# Patient Record
Sex: FEMALE | Race: WHITE | NOT HISPANIC OR LATINO
[De-identification: names, ages, dates, MRNs, and addresses within clinical notes are randomized per-mention and may not be internally consistent; named-entity substitution may affect disease eponyms.]

---

## 2020-06-24 ENCOUNTER — TRANSCRIPTION ENCOUNTER (OUTPATIENT)
Age: 35
End: 2020-06-24

## 2020-06-29 ENCOUNTER — TRANSCRIPTION ENCOUNTER (OUTPATIENT)
Age: 35
End: 2020-06-29

## 2020-07-14 ENCOUNTER — TRANSCRIPTION ENCOUNTER (OUTPATIENT)
Age: 35
End: 2020-07-14

## 2021-10-14 ENCOUNTER — TRANSCRIPTION ENCOUNTER (OUTPATIENT)
Age: 36
End: 2021-10-14

## 2024-01-26 ENCOUNTER — APPOINTMENT (OUTPATIENT)
Dept: OTOLARYNGOLOGY | Facility: CLINIC | Age: 39
End: 2024-01-26
Payer: COMMERCIAL

## 2024-01-26 ENCOUNTER — NON-APPOINTMENT (OUTPATIENT)
Age: 39
End: 2024-01-26

## 2024-01-26 VITALS
WEIGHT: 130 LBS | HEIGHT: 66 IN | BODY MASS INDEX: 20.89 KG/M2 | SYSTOLIC BLOOD PRESSURE: 120 MMHG | DIASTOLIC BLOOD PRESSURE: 78 MMHG | HEART RATE: 99 BPM

## 2024-01-26 PROBLEM — Z00.00 ENCOUNTER FOR PREVENTIVE HEALTH EXAMINATION: Status: ACTIVE | Noted: 2024-01-26

## 2024-01-26 PROCEDURE — 99204 OFFICE O/P NEW MOD 45 MIN: CPT | Mod: 25

## 2024-01-26 PROCEDURE — 31579 LARYNGOSCOPY TELESCOPIC: CPT

## 2024-01-26 NOTE — PROCEDURE
[de-identified] : -  Pre-operative Diagnosis: Dysphonia Post-operative Diagnosis: Same Anesthesia: Topical - 1 % Lidocaine/Phenylephrine Procedure: Flexible Laryngoscopy with Stroboscopy - CPT 67621   Procedure Details: The patient was placed in the sitting position. After decongestant and anesthesia were applied the laryngoscope was passed. The nasal cavities, nasopharynx, oropharynx, hypopharynx, and larynx were all examined. Vocal folds were examined during respiration and phonation. The following findings were noted:   Findings: Nose: Septum is midline, turbinates are normal, nasal airways patent, mucosa normal Nasopharynx: Adenoids normal, no masses, eustachian tube normal Oropharynx: Pharyngeal walls symmetric and without lesion. Tonsils/fossae symmetric Hypopharynx: Hypopharynx and pyriform sinuses without lesion. No masses or asymmetry. + pooling of secretions. Larynx: Epiglottis and aryepiglottic folds were sharp and crisp bilaterally. Bilateral false vocal folds normal appearance. Airway was widely patent.   Strobe Exam Ratings   TVF Appearance: hakeem TVF Mobility: normal Edema/hypertrophy: normal Mucus on TVF: normal Glottic Closure: normal/adequate Mucosal Wave: normal Amplitude of Vibration: normal Phase: symmetric Supraglottic Hyperfunction: none Other Findings: none   Condition: Stable. Patient tolerated procedure well.   Complications: None

## 2024-01-26 NOTE — HISTORY OF PRESENT ILLNESS
[de-identified] : 1/26/24: 39 y/o F presents with inability to burp for several years. She cannot recall the last time she was able to burp. She endorses gurgling sensation in her throat but cannot burp. She intermittently gets gas pains throughout the day and abdominal discomfort. She is seeing a GI for small intestinal bacterial overgrowth. No issues eating, chewing, or swallowing. No cough, or regurgitation of food. No issues breathing. She has history of vocal nodules in her 20's. She feels her voice has been "raspy" for several years. No significant voice demands. She recently had second baby in August and is a full-time mom of 2 children. She has history of vocal cord paralysis after croup as a child.   Diet: +coffee, tomato, citrus, garlic, onion, blueberry, spicy food -tea, soda, chocolate, mint, citrus, carbonated beverages water intake: 8 glasses late night eating: sometimes

## 2024-01-26 NOTE — ASSESSMENT
[FreeTextEntry1] : - 39 y/o F presents with inability to burp for several years. She cannot recall the last time she was able to burp. She endorses gurgling sensation in her throat but cannot burp. She feels her voice has been "raspy" for several years. Physical exam/ laryngoscopy/ stroboscopy was normal. 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 We discussed treatment options including suspension MicroDirect laryngoscopy and injection of Botox into cricopharyngeus muscle, pending medical clearance. Risk, benefits and alternatives of surgery were discussed including bleeding, infection, pain, risk of anesthesia, and chipped teeth.  -Plan for MicroDirect laryngoscopy and injection of Botox into cricopharyngeus muscle -Medical clearance

## 2024-01-26 NOTE — PHYSICAL EXAM
[Normal] : mucosa is normal [Midline] : trachea located in midline position [FreeTextEntry1] : good projection, good range

## 2024-02-08 ENCOUNTER — APPOINTMENT (OUTPATIENT)
Dept: INTERNAL MEDICINE | Facility: CLINIC | Age: 39
End: 2024-02-08
Payer: COMMERCIAL

## 2024-02-08 VITALS
HEIGHT: 67 IN | WEIGHT: 130 LBS | DIASTOLIC BLOOD PRESSURE: 80 MMHG | BODY MASS INDEX: 20.4 KG/M2 | TEMPERATURE: 97.7 F | SYSTOLIC BLOOD PRESSURE: 118 MMHG | HEART RATE: 75 BPM | OXYGEN SATURATION: 98 %

## 2024-02-08 DIAGNOSIS — Z01.818 ENCOUNTER FOR OTHER PREPROCEDURAL EXAMINATION: ICD-10-CM

## 2024-02-08 PROCEDURE — 93000 ELECTROCARDIOGRAM COMPLETE: CPT

## 2024-02-08 PROCEDURE — 36415 COLL VENOUS BLD VENIPUNCTURE: CPT

## 2024-02-08 PROCEDURE — 99214 OFFICE O/P EST MOD 30 MIN: CPT | Mod: 25

## 2024-02-09 PROBLEM — Z01.818 PREOP EXAMINATION: Status: ACTIVE | Noted: 2024-02-09

## 2024-02-09 LAB
ANION GAP SERPL CALC-SCNC: 14 MMOL/L
BUN SERPL-MCNC: 12 MG/DL
CALCIUM SERPL-MCNC: 9.2 MG/DL
CHLORIDE SERPL-SCNC: 105 MMOL/L
CO2 SERPL-SCNC: 19 MMOL/L
CREAT SERPL-MCNC: 0.63 MG/DL
EGFR: 118 ML/MIN/1.73M2
GLUCOSE SERPL-MCNC: 94 MG/DL
HCT VFR BLD CALC: 36.7 %
HGB BLD-MCNC: 12.5 G/DL
MCHC RBC-ENTMCNC: 32.9 PG
MCHC RBC-ENTMCNC: 34.1 GM/DL
MCV RBC AUTO: 96.6 FL
PLATELET # BLD AUTO: 342 K/UL
POTASSIUM SERPL-SCNC: 4.3 MMOL/L
RBC # BLD: 3.8 M/UL
RBC # FLD: 13.8 %
SODIUM SERPL-SCNC: 138 MMOL/L
WBC # FLD AUTO: 8.04 K/UL

## 2024-02-09 NOTE — PLAN
[FreeTextEntry1] : #Preoperative Risk Assessment EKG performed, NSR. CBC and CMP checked per surgeon request Patient medically optimized and cleared for procedure.

## 2024-02-09 NOTE — HISTORY OF PRESENT ILLNESS
[No Pertinent Cardiac History] : no history of aortic stenosis, atrial fibrillation, coronary artery disease, recent myocardial infarction, or implantable device/pacemaker [No Pertinent Pulmonary History] : no history of asthma, COPD, sleep apnea, or smoking [No Adverse Anesthesia Reaction] : no adverse anesthesia reaction in self or family member [(Patient denies any chest pain, claudication, dyspnea on exertion, orthopnea, palpitations or syncope)] : Patient denies any chest pain, claudication, dyspnea on exertion, orthopnea, palpitations or syncope [Excellent (>10 METs)] : Excellent (>10 METs) [Herbal Supplements: _____] : Herbal Supplements: [unfilled] [Chronic Anticoagulation] : no chronic anticoagulation [Chronic Kidney Disease] : no chronic kidney disease [FreeTextEntry1] : cricopharyngeal injection of botox [FreeTextEntry2] : February 21 2024 [FreeTextEntry3] : Dr. Griffiths [FreeTextEntry4] : 38F no significant PMHx, presents for pre-op clearance before cricopharyngeal injection of botox with ENT Dr. Griffiths. Patient reports some mild abdominal distention and mild R hip pain which she says is 2/2 orthopedic injury when she was younger. Otherwise no acute complaints. Is able to perform all household duties and walk miles outside without any dyspnea or cardiopulmonary discomfort. Patient is medically optimized for procedure, and labs and EKG not required from medical standpoint, but performed per surgeon's request.

## 2024-02-27 ENCOUNTER — TRANSCRIPTION ENCOUNTER (OUTPATIENT)
Age: 39
End: 2024-02-27

## 2024-02-27 RX ORDER — SODIUM CHLORIDE 9 MG/ML
1000 INJECTION, SOLUTION INTRAVENOUS
Refills: 0 | Status: DISCONTINUED | OUTPATIENT
Start: 2024-02-28 | End: 2024-02-28

## 2024-02-27 RX ORDER — ACETAMINOPHEN 500 MG
1000 TABLET ORAL ONCE
Refills: 0 | Status: DISCONTINUED | OUTPATIENT
Start: 2024-02-28 | End: 2024-02-28

## 2024-02-27 RX ORDER — OXYCODONE HYDROCHLORIDE 5 MG/1
5 TABLET ORAL ONCE
Refills: 0 | Status: DISCONTINUED | OUTPATIENT
Start: 2024-02-28 | End: 2024-02-28

## 2024-02-27 RX ORDER — FENTANYL CITRATE 50 UG/ML
25 INJECTION INTRAVENOUS
Refills: 0 | Status: DISCONTINUED | OUTPATIENT
Start: 2024-02-28 | End: 2024-02-28

## 2024-02-27 NOTE — ASU PATIENT PROFILE, ADULT - NSICDXPASTSURGICALHX_GEN_ALL_CORE_FT
PAST SURGICAL HISTORY:  No significant past surgical history
I, Drew Orellana, performed the initial face to face bedside interview with this patient regarding history of present illness, review of symptoms and relevant past medical, social and family history.  I completed an independent physical examination.  I was the initial provider who evaluated this patient. I have signed out the follow up of any pending tests (i.e. labs, radiological studies) to the ACP.  I have communicated the patient’s plan of care and disposition with the ACP.

## 2024-02-27 NOTE — ASU PATIENT PROFILE, ADULT - NS TRANSFER RESPONSE BELONGING
Problem: Fall Risk (Adult)  Goal: Identify Related Risk Factors and Signs and Symptoms  Outcome: Ongoing (interventions implemented as appropriate)    Goal: Absence of Fall  Outcome: Ongoing (interventions implemented as appropriate)      Problem: Skin Injury Risk (Adult)  Goal: Skin Health and Integrity  Outcome: Ongoing (interventions implemented as appropriate)      Problem: Cardiac Output Decreased (Adult)  Goal: Identify Related Risk Factors and Signs and Symptoms  Outcome: Ongoing (interventions implemented as appropriate)    Goal: Effective Tissue Perfusion  Outcome: Ongoing (interventions implemented as appropriate)      Problem: Pain, Acute (Adult)  Goal: Identify Related Risk Factors and Signs and Symptoms  Outcome: Ongoing (interventions implemented as appropriate)    Goal: Acceptable Pain Control/Comfort Level  Outcome: Ongoing (interventions implemented as appropriate)      Problem: Patient Care Overview  Goal: Plan of Care Review  Outcome: Ongoing (interventions implemented as appropriate)    Goal: Individualization and Mutuality  Outcome: Ongoing (interventions implemented as appropriate)    Goal: Discharge Needs Assessment  Outcome: Ongoing (interventions implemented as appropriate)    Goal: Interprofessional Rounds/Family Conf  Outcome: Ongoing (interventions implemented as appropriate)         yes

## 2024-02-27 NOTE — ASU PATIENT PROFILE, ADULT - ABLE TO REACH PT
Left voicemail message with time and instruction; patient instructed to arrive at 12:15pm; No solid food/dairy/candy/gum after midnight; water allowed before 11:00am tomorrow; patient reminded to come with photo ID/insurance/credit card; dress in comfortable clothes; no jewelries/contact lens/valuable; no smoking/alcohol drinking/recreational drug use today; escort to have photo ID; address and callback number was given;/no

## 2024-02-27 NOTE — PRE-ANESTHESIA EVALUATION ADULT - NSANTHBPHIGHRD_ENT_A_CORE
"Requested Prescriptions   Pending Prescriptions Disp Refills     ADVAIR DISKUS 250-50 MCG/DOSE diskus inhaler [Pharmacy Med Name: ADVAIR DISKUS 250/50MCG (YELLOW) 60]  Last Written Prescription Date:  7-25-18  Last Fill Quantity: 1 inh,  # refills: 1   Last office visit: 4/19/2018 with prescribing provider:  Fina Frausto    Future Office Visit:     0     Sig: INHALE 1 PUFF INTO THE LUNGS TWICE DAILY    Inhaled Steroids Protocol Passed    10/5/2018  9:10 AM       Passed - Patient is age 12 or older       Passed - Recent (12 mo) or future (30 days) visit within the authorizing provider's specialty    Patient had office visit in the last 12 months or has a visit in the next 30 days with authorizing provider or within the authorizing provider's specialty.  See \"Patient Info\" tab in inbasket, or \"Choose Columns\" in Meds & Orders section of the refill encounter.              " No

## 2024-02-28 ENCOUNTER — APPOINTMENT (OUTPATIENT)
Dept: OTOLARYNGOLOGY | Facility: HOSPITAL | Age: 39
End: 2024-02-28

## 2024-02-28 ENCOUNTER — TRANSCRIPTION ENCOUNTER (OUTPATIENT)
Age: 39
End: 2024-02-28

## 2024-02-28 ENCOUNTER — OUTPATIENT (OUTPATIENT)
Dept: OUTPATIENT SERVICES | Facility: HOSPITAL | Age: 39
LOS: 1 days | Discharge: HOME | End: 2024-02-28
Payer: COMMERCIAL

## 2024-02-28 VITALS
SYSTOLIC BLOOD PRESSURE: 109 MMHG | OXYGEN SATURATION: 98 % | WEIGHT: 127.87 LBS | RESPIRATION RATE: 16 BRPM | DIASTOLIC BLOOD PRESSURE: 68 MMHG | HEART RATE: 54 BPM | TEMPERATURE: 98 F | HEIGHT: 67 IN

## 2024-02-28 VITALS
RESPIRATION RATE: 16 BRPM | OXYGEN SATURATION: 99 % | SYSTOLIC BLOOD PRESSURE: 100 MMHG | HEART RATE: 55 BPM | TEMPERATURE: 99 F | DIASTOLIC BLOOD PRESSURE: 66 MMHG

## 2024-02-28 PROCEDURE — 43192 ESOPHAGOSCP RIG TRNSO INJECT: CPT

## 2024-02-28 PROCEDURE — 64616 CHEMODENERV MUSC NECK DYSTON: CPT

## 2024-02-28 PROCEDURE — 31526 DX LARYNGOSCOPY W/OPER SCOPE: CPT

## 2024-02-28 DEVICE — STONE EXTRACTOR NGAGE NITINOL 1.7FR 8MM: Type: IMPLANTABLE DEVICE | Status: FUNCTIONAL

## 2024-02-28 DEVICE — GEL PROLARYN 1 CC SYR W TRANS ORAL NDL: Type: IMPLANTABLE DEVICE | Status: FUNCTIONAL

## 2024-02-28 DEVICE — TUBE TRACH SZ 6 UNCUFF FLEX REUSE: Type: IMPLANTABLE DEVICE | Status: FUNCTIONAL

## 2024-02-28 RX ORDER — ACETAMINOPHEN 500 MG
1000 TABLET ORAL ONCE
Refills: 0 | Status: COMPLETED | OUTPATIENT
Start: 2024-02-28 | End: 2024-02-28

## 2024-02-28 RX ORDER — NORETHINDRONE AND ETHINYL ESTRADIOL 0.4-0.035
1 KIT ORAL
Refills: 0 | DISCHARGE

## 2024-02-28 RX ORDER — APREPITANT 80 MG/1
40 CAPSULE ORAL ONCE
Refills: 0 | Status: COMPLETED | OUTPATIENT
Start: 2024-02-28 | End: 2024-02-28

## 2024-02-28 RX ORDER — SERTRALINE 25 MG/1
1 TABLET, FILM COATED ORAL
Refills: 0 | DISCHARGE

## 2024-02-28 RX ORDER — OXYCODONE AND ACETAMINOPHEN 5; 325 MG/1; MG/1
1 TABLET ORAL
Qty: 4 | Refills: 0
Start: 2024-02-28 | End: 2024-02-28

## 2024-02-28 RX ORDER — VALACYCLOVIR 500 MG/1
1 TABLET, FILM COATED ORAL
Refills: 0 | DISCHARGE

## 2024-02-28 RX ADMIN — APREPITANT 40 MILLIGRAM(S): 80 CAPSULE ORAL at 13:35

## 2024-02-28 RX ADMIN — Medication 1000 MILLIGRAM(S): at 13:36

## 2024-02-28 NOTE — ASU DISCHARGE PLAN (ADULT/PEDIATRIC) - ASU DC SPECIAL INSTRUCTIONSFT
Take prescribed pain meds or tylenol as needed.    Warning Signs:  Please call your doctor or nurse practitioner if you experience the following:  *You experience new chest pain, pressure, squeezing or tightness.  *New or worsening cough, shortness of breath, or wheeze.  *If you are vomiting and cannot keep down fluids or your medications.  *You are getting dehydrated due to continued vomiting, diarrhea, or other reasons. Signs of dehydration include dry mouth, rapid heartbeat, or feeling dizzy or faint when standing.  *You experience burning when you urinate, have blood in your urine, or experience a discharge.  *Your pain is not improving within 8-12 hours or is not gone within 24 hours.  *You have shaking chills, or fever greater than 101.5 degrees Fahrenheit or 38 degrees Celsius.  *Any change in your symptoms, or any new symptoms that concern you.

## 2024-02-28 NOTE — ASU PREOP CHECKLIST - TEMPERATURE IN FAHRENHEIT (DEGREES F)
Chart reviewed, patient is ok to proceed with  surgery at [facility].   
97.5

## 2024-02-28 NOTE — ASU DISCHARGE PLAN (ADULT/PEDIATRIC) - CARE PROVIDER_API CALL
Charles Griffiths  Otolaryngology  [address]  [place] [address]  Phone: [phone]  Fax: [phone]  Follow Up Time:

## 2024-03-05 ENCOUNTER — APPOINTMENT (OUTPATIENT)
Dept: OTOLARYNGOLOGY | Facility: CLINIC | Age: 39
End: 2024-03-05
Payer: COMMERCIAL

## 2024-03-05 VITALS
HEIGHT: 67 IN | TEMPERATURE: 97.8 F | DIASTOLIC BLOOD PRESSURE: 79 MMHG | SYSTOLIC BLOOD PRESSURE: 122 MMHG | HEART RATE: 83 BPM | WEIGHT: 130 LBS | BODY MASS INDEX: 20.4 KG/M2

## 2024-03-05 DIAGNOSIS — R49.0 DYSPHONIA: ICD-10-CM

## 2024-03-05 DIAGNOSIS — R13.14 DYSPHAGIA, PHARYNGOESOPHAGEAL PHASE: ICD-10-CM

## 2024-03-05 DIAGNOSIS — R14.2 ERUCTATION: ICD-10-CM

## 2024-03-05 PROBLEM — F41.9 ANXIETY DISORDER, UNSPECIFIED: Chronic | Status: ACTIVE | Noted: 2024-02-28

## 2024-03-05 PROBLEM — U07.1 COVID-19: Chronic | Status: ACTIVE | Noted: 2024-02-28

## 2024-03-05 PROCEDURE — 31579 LARYNGOSCOPY TELESCOPIC: CPT | Mod: 78

## 2024-03-05 PROCEDURE — 99024 POSTOP FOLLOW-UP VISIT: CPT

## 2024-03-05 PROCEDURE — 92612 ENDOSCOPY SWALLOW (FEES) VID: CPT

## 2024-03-05 NOTE — ASSESSMENT
[FreeTextEntry1] : - 37 y/o F presents with inability to burp for several years. She cannot recall the last time she was able to burp. She endorses gurgling sensation in her throat but cannot burp. She feels her voice has been "raspy" for several years. Physical exam/ laryngoscopy/ stroboscopy was normal. 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 We discussed treatment options including suspension MicroDirect laryngoscopy and injection of Botox into cricopharyngeus muscle, pending medical clearance. Risk, benefits and alternatives of surgery were discussed including bleeding, infection, pain, risk of anesthesia, and chipped teeth.  3/5/24: POD # 6 s/p MicroDirect laryngoscopy and injection of Botox into cricopharyngeus muscle for functional burping disorder. She has been burping and notes improvement in terms of abdominal bloating. She presents with some dysphagia and throat discomfort. Laryngoscopy/ stroboscopy were normal. On FEES she had some pooling of secretions, but she had no penetration or aspiration. I am recommending consultation with SLP for swallowing therapy, and to determine if she needs a formal MBS. I am also recommending she continue to get adequate nutrition and to alternate with small sips of water. Follow up with me in 6 weeks after she has worked with SLP, sooner should symptoms worsen/ fail to improve.   -Consultation with SLP for swallowing therapy, consider MBS -Adequate nutrition, alternate small sips of water -Follow up with me in 6 weeks after SLP eval, sooner should symptoms worsen/ fail to improve

## 2024-03-05 NOTE — REASON FOR VISIT
[Subsequent Evaluation] : a subsequent evaluation for [FreeTextEntry2] : inability to burp, dysphagia

## 2024-03-05 NOTE — PHYSICAL EXAM
[Midline] : trachea located in midline position [Normal] : lingual tonsils are normal [FreeTextEntry1] : good projection, good range, maximal phonation time: 19 seconds

## 2024-03-05 NOTE — HISTORY OF PRESENT ILLNESS
[de-identified] : 1/26/24: 37 y/o F presents with inability to burp for several years. She cannot recall the last time she was able to burp. She endorses gurgling sensation in her throat but cannot burp. She intermittently gets gas pains throughout the day and abdominal discomfort. She is seeing a GI for small intestinal bacterial overgrowth. No issues eating, chewing, or swallowing. No cough, or regurgitation of food. No issues breathing. She has history of vocal nodules in her 20's. She feels her voice has been "raspy" for several years. No significant voice demands. She recently had second baby in August and is a full-time mom of 2 children. She has history of vocal cord paralysis after croup as a child.   Diet: +coffee, tomato, citrus, garlic, onion, blueberry, spicy food -tea, soda, chocolate, mint, citrus, carbonated beverages water intake: 8 glasses late night eating: sometimes - [FreeTextEntry1] : 3/5/24: POD # 6 s/p MicroDirect laryngoscopy and injection of Botox into cricopharyngeus muscle for functional burping disorder. She has been burping and notes improvement in terms of abdominal bloating. She presents with some dysphagia and throat discomfort. She reports foods are going down, but she needs to drink more water and put in more effort to swallow. She was able to swallow scrambled eggs and salmon, but had difficulty with salad. She is getting adequate nutrition. Yesterday she had some sob with exertion and notes difficulty with projection of voice.

## 2024-03-05 NOTE — PROCEDURE
[de-identified] : -  Pre-operative Diagnosis: Dysphonia Post-operative Diagnosis: Same, mild pooling in the piriform sinus bilaterally Anesthesia: Topical - 1 % Lidocaine/Phenylephrine Procedure: Flexible Laryngoscopy with Stroboscopy - CPT 22088  Procedure Details: The patient was placed in the sitting position. After decongestant and anesthesia were applied the laryngoscope was passed. The nasal cavities, nasopharynx, oropharynx, hypopharynx, and larynx were all examined. Vocal folds were examined during respiration and phonation. The following findings were noted:  Findings: Nose: Septum is midline, turbinates are normal, nasal airways patent, mucosa normal Nasopharynx: Adenoids normal, no masses, eustachian tube normal Oropharynx: Pharyngeal walls symmetric and without lesion. Tonsils/fossae symmetric Hypopharynx: Hypopharynx and pyriform sinuses without lesion. No masses or asymmetry. + Mild pooling of secretions. Larynx: Epiglottis and aryepiglottic folds were sharp and crisp bilaterally. Bilateral false vocal folds normal appearance. Airway was widely patent.  Strobe Exam Ratings  TVF Appearance: hakeem TVF Mobility: normal Edema/hypertrophy: normal Mucus on TVF: normal Glottic Closure: normal/adequate Mucosal Wave: normal Amplitude of Vibration: normal Phase: symmetric Supraglottic Hyperfunction: none Other Findings: none  Condition: Stable. Patient tolerated procedure well.  Complications: None. ------------------------------------------------------------------------------------------------------   FEES  Fiberoptic Endoscopic Evaluation of Swallow (FEES) with sensory testing - cpt 30971 Water:  aspiration: none  penetration: none   Apple Sauce:  aspiration: none  penetration: none  Singh Cracker:  aspiration: none  penetration: none  Sensory testing: strong reflux and cough when touching epiglottis and endolarynx.

## 2024-04-16 ENCOUNTER — APPOINTMENT (OUTPATIENT)
Dept: OTOLARYNGOLOGY | Facility: CLINIC | Age: 39
End: 2024-04-16

## (undated) DEVICE — DRAPE SPLIT SHEET 77" X 108"

## (undated) DEVICE — REUSABLE OROTRACHEAL LARYNGEAL INJECTOR NEEDLE

## (undated) DEVICE — SPECIMEN CONTAINER 4OZ

## (undated) DEVICE — GOWN ROYAL SILK XL

## (undated) DEVICE — Device

## (undated) DEVICE — SYR LUER LOK 5CC

## (undated) DEVICE — DRAPE TOWEL BLUE 17" X 24"

## (undated) DEVICE — SYR LUER LOK 10CC

## (undated) DEVICE — DRAPE MAYO STAND 23"

## (undated) DEVICE — GLV 7.5 PROTEXIS (WHITE)

## (undated) DEVICE — SUT PROLENE 3-0 18" PS-1

## (undated) DEVICE — TUBING RAPIDVAC SMOKE EVACUATOR .25" X 10FT

## (undated) DEVICE — DRSG TELFA 3 X 8

## (undated) DEVICE — TUBING SUCTION NONCONDUCTIVE 6MM X 12FT

## (undated) DEVICE — SYR SLIP LOCK 1CC

## (undated) DEVICE — SUT NDL MAYO CATGUT 1/2 CIRCLE TAPER POINT 0.050" X 0.897"

## (undated) DEVICE — SUCTION CATH ARGYLE WHISTLE TIP 14FR STRAIGHT PACKED

## (undated) DEVICE — SPEAR SURG EYE WECK-CELL CELOS

## (undated) DEVICE — SOL ANTI FOG